# Patient Record
Sex: MALE | Race: WHITE
[De-identification: names, ages, dates, MRNs, and addresses within clinical notes are randomized per-mention and may not be internally consistent; named-entity substitution may affect disease eponyms.]

---

## 2017-01-22 ENCOUNTER — HOSPITAL ENCOUNTER (EMERGENCY)
Dept: HOSPITAL 62 - ER | Age: 27
Discharge: HOME | End: 2017-01-22
Payer: COMMERCIAL

## 2017-01-22 VITALS — DIASTOLIC BLOOD PRESSURE: 66 MMHG | SYSTOLIC BLOOD PRESSURE: 108 MMHG

## 2017-01-22 DIAGNOSIS — F84.0: ICD-10-CM

## 2017-01-22 DIAGNOSIS — Z87.820: ICD-10-CM
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
## 2017-01-22 DIAGNOSIS — G80.9: ICD-10-CM
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
## 2017-01-22 DIAGNOSIS — S00.93XA: Primary | ICD-10-CM

## 2017-01-22 DIAGNOSIS — G40.909: ICD-10-CM

## 2017-01-22 LAB
ALBUMIN SERPL-MCNC: 4.3 G/DL (ref 3.5–5)
ALP SERPL-CCNC: 68 U/L (ref 38–126)
ALT SERPL-CCNC: 30 U/L (ref 21–72)
ANION GAP SERPL CALC-SCNC: 9 MMOL/L (ref 5–19)
APPEARANCE UR: CLEAR
AST SERPL-CCNC: 20 U/L (ref 17–59)
BASOPHILS # BLD AUTO: 0 10^3/UL (ref 0–0.2)
BASOPHILS NFR BLD AUTO: 0.5 % (ref 0–2)
BILIRUB DIRECT SERPL-MCNC: 0 MG/DL (ref 0–0.3)
BILIRUB SERPL-MCNC: 0.9 MG/DL (ref 0.2–1.3)
BILIRUB UR QL STRIP: NEGATIVE
BUN SERPL-MCNC: 10 MG/DL (ref 7–20)
CALCIUM: 9 MG/DL (ref 8.4–10.2)
CHLORIDE SERPL-SCNC: 106 MMOL/L (ref 98–107)
CO2 SERPL-SCNC: 27 MMOL/L (ref 22–30)
CREAT SERPL-MCNC: 0.59 MG/DL (ref 0.52–1.25)
EOSINOPHIL # BLD AUTO: 0 10^3/UL (ref 0–0.6)
EOSINOPHIL NFR BLD AUTO: 0 % (ref 0–6)
ERYTHROCYTE [DISTWIDTH] IN BLOOD BY AUTOMATED COUNT: 12.9 % (ref 11.5–14)
GLUCOSE SERPL-MCNC: 115 MG/DL (ref 75–110)
GLUCOSE UR STRIP-MCNC: NEGATIVE MG/DL
HCT VFR BLD CALC: 39.7 % (ref 37.9–51)
HGB BLD-MCNC: 13.1 G/DL (ref 13.5–17)
HGB HCT DIFFERENCE: -0.4
KETONES UR STRIP-MCNC: NEGATIVE MG/DL
LYMPHOCYTES # BLD AUTO: 0.8 10^3/UL (ref 0.5–4.7)
LYMPHOCYTES NFR BLD AUTO: 13.7 % (ref 13–45)
MCH RBC QN AUTO: 28.5 PG (ref 27–33.4)
MCHC RBC AUTO-ENTMCNC: 33.1 G/DL (ref 32–36)
MCV RBC AUTO: 86 FL (ref 80–97)
MONOCYTES # BLD AUTO: 0.3 10^3/UL (ref 0.1–1.4)
MONOCYTES NFR BLD AUTO: 6 % (ref 3–13)
NEUTROPHILS # BLD AUTO: 4.6 10^3/UL (ref 1.7–8.2)
NEUTS SEG NFR BLD AUTO: 79.8 % (ref 42–78)
NITRITE UR QL STRIP: NEGATIVE
PH UR STRIP: 7 [PH] (ref 5–9)
POTASSIUM SERPL-SCNC: 4.4 MMOL/L (ref 3.6–5)
PROT SERPL-MCNC: 6.4 G/DL (ref 6.3–8.2)
PROT UR STRIP-MCNC: NEGATIVE MG/DL
RBC # BLD AUTO: 4.6 10^6/UL (ref 4.35–5.55)
SODIUM SERPL-SCNC: 142.4 MMOL/L (ref 137–145)
SP GR UR STRIP: 1.02
UROBILINOGEN UR-MCNC: 2 MG/DL (ref ?–2)
WBC # BLD AUTO: 5.7 10^3/UL (ref 4–10.5)

## 2017-01-22 PROCEDURE — 36415 COLL VENOUS BLD VENIPUNCTURE: CPT

## 2017-01-22 PROCEDURE — 85025 COMPLETE CBC W/AUTO DIFF WBC: CPT

## 2017-01-22 PROCEDURE — 80053 COMPREHEN METABOLIC PANEL: CPT

## 2017-01-22 PROCEDURE — 70450 CT HEAD/BRAIN W/O DYE: CPT

## 2017-01-22 PROCEDURE — 81001 URINALYSIS AUTO W/SCOPE: CPT

## 2017-01-22 PROCEDURE — 99284 EMERGENCY DEPT VISIT MOD MDM: CPT

## 2017-01-22 PROCEDURE — 72125 CT NECK SPINE W/O DYE: CPT

## 2017-01-22 NOTE — ER DOCUMENT REPORT
ED General





- General


Stated Complaint: POSSIBLE SEIZURES


Time seen by provider: 15:01


Mode of Arrival: Ambulatory


Information source: Patient


Notes: 


This is a 26-year-old man with a history of autism/cerebral palsy, TBI (6 

months old) who was brought in by ambulance after a grand mal seizure witnessed 

by the parents.  Patient's baseline is that he is nonverbal but the parents can 

find ways to communicate with him.  The patient's mother heard a bang and went 

into his room and found him at his desk where he had fallen out of his chair.  

The parents describe the patient with tonic-clonic activity, and an episode of 

flexion lasting approximately 2 minutes.  Patient did have blood in the mouth 

from a tongue bite.  They're unsure about incontinence (the patient wears pull-

ups).  Currently the patient is back to baseline.


Review of systems: Parents deny any recent fevers chills, nausea vomiting


TRAVEL OUTSIDE OF THE [place] IN LAST 30 DAYS: No





- HPI


Onset: Just prior to arrival


Onset/Duration: Sudden


Quality of pain: No pain


Severity: None


Pain Level: Denies


Associated symptoms: denies: Chills, Nonproductive cough, Productive cough, 

Fever, Shortness of breath


Exacerbated by: Denies


Relieved by: Denies


Similar symptoms previously: Yes


Recently seen / treated by doctor: No





- Related Data


Allergies/Adverse Reactions: 


 





No Known Allergies Allergy (Verified 02/16/15 14:41)


 











Past Medical History





- General


Information source: Parent





- Social History


Smoking Status: Never Smoker


Cigarette use (# per day): No


Chew tobacco use (# tins/day): No


Frequency of alcohol use: None


Drug Abuse: None


Lives with: Family


Family History: Reviewed & Not Pertinent


Patient has suicidal ideation: No


Patient has homicidal ideation: No





- Past Medical History


Cardiac Medical History: Reports: None


Pulmonary Medical History: Reports: None


EENT Medical History: Reports: None


Neurological Medical History: Reports: Other - Autism, cerebral palsy, 

traumatic brain injury, evaluated for seizures in the past


Endocrine Medical History: Reports: None


Renal/ Medical History: Reports: None


Malignancy Medical History: Reports None


GI Medical History: Reports: None


Musculoskeltal Medical History: Reports None


Skin Medical History: Reports None


Psychiatric Medical History: Reports: None


Traumatic Medical History: Reports: Hx Traumatic Brain Injury


Infectious Medical History: Reports: None


Surgical Hx: Other - Noncontributory





- Immunizations


Hx Diphtheria, Pertussis, Tetanus Vaccination: Yes





Review of Systems





- Review of Systems


Constitutional: denies: Chills, Fever


EENT: No symptoms reported


Cardiovascular: No symptoms reported


Respiratory: No symptoms reported


Gastrointestinal: No symptoms reported


Genitourinary: No symptoms reported


Male Genitourinary: No symptoms reported


Musculoskeletal: No symptoms reported


Skin: No symptoms reported


Hematologic/Lymphatic: No symptoms reported


Neurological/Psychological: See HPI





Physical Exam





- Vital signs


Vitals: 


 











Temp Pulse BP Pulse Ox


 


 97.5 F   108 H  109/57 L  98 


 


 01/22/17 15:56  01/22/17 15:56  01/22/17 15:56  01/22/17 15:56











Notes: 


Physical exam:


 


GENERAL: Patient is alert, nonverbal, does appear calm.


HEAD: He does have a right frontal hematoma, normocephalic.


EYES: Pupils equal round and reactive to light, extraocular movements intact, 

sclera anicteric, conjunctiva are normal.


ENT: TMs normal, nares patent, oropharynx clear without exudates.  Patient does 

have mild bite to the inner lip with some dried bleeding.  There is no obvious 

tongue bites.  The teeth are intact.  Moist mucous membranes.


NECK: Cervical collar in place


LUNGS: Breath sounds clear to auscultation bilaterally and equal.  No wheezes 

rales or rhonchi.


HEART: Regular rate and rhythm without murmurs, rubs or gallops.


ABDOMEN: Soft, nontender, normoactive bowel sounds.  No guarding, no rebound.  

No masses appreciated.


EXTREMITIES: Normal range of motion, no pitting or edema.  No clubbing or 

cyanosis.


NEUROLOGICAL: Cranial nerves II through XII grossly intact.  Normal speech, 

moving all extremities.


PSYCH: Normal mood, normal affect.


SKIN: Warm, Dry, normal turgor, no rashes or lesions noted.





Course





- Re-evaluation


Re-evalutation: 


01/22/17 18:22


A she was observed several hours in the ER and has been fine.  He is alert and 

even though he is nonverbal he he does communicate with his parents.  He is 

moving his head around and has no neck rigidity at all.  There is no 

photophobia.  He looks comfortable at this time.  





Discussed case with Dr. Ohara who has seen the patient in the past.  He did 

recommend that the patient start taking Keppra 500 mg twice a day.  He does 

want to see the patient at 1:30 tomorrow afternoon.  I've discussed this with 

the parents and they are in agreement with the plan.  I will start the Keppra 

in the ER tonight.





01/22/17 18:34








- Vital Signs


Vital signs: 


 











Temp Pulse Resp BP Pulse Ox


 


 97.5 F   93      108/66   96 


 


 01/22/17 15:56  01/22/17 18:14     01/22/17 18:14  01/22/17 18:14














- Laboratory


Result Diagrams: 


 01/22/17 15:20





 01/22/17 15:20


Laboratory results interpreted by me: 


 











  01/22/17 01/22/17 01/22/17





  15:20 15:20 16:38


 


Hgb  13.1 L  


 


Seg Neutrophils %  79.8 H  


 


Glucose   115 H 


 


Urine Urobilinogen    2.0 H














- Diagnostic Test


Radiology reviewed: Image reviewed, Reports reviewed - CT of the head shows 

chronic old changes.  CT of the cervical spine shows no acute fracture.





Discharge





- Discharge


Clinical Impression: 


 seizure, contusion to the head





Condition: Stable


Disposition: HOME, SELF-CARE


Instructions:  New Seizure ([facility])


Additional Instructions: 


Recommendations:





Start the Room: 500 mg twice daily.


Follow-up with Dr. Ohara: He said just come to the office at 1:30 PM.  Tell 

the  that the ER doctor at spoken to Dr. Ohara and Dr. Ohara specifically requested that he be seen at 1:30 PM.


Return to the emergency room for any concerns at shortness getting worse: Fever

, chills, recurrent seizures.


Prescriptions: 


Levetiracetam [Keppra 500 mg Tablet] 500 mg PO Q12 #60 tablet

## 2017-01-29 ENCOUNTER — HOSPITAL ENCOUNTER (EMERGENCY)
Dept: HOSPITAL 62 - ER | Age: 27
Discharge: HOME | End: 2017-01-29
Payer: COMMERCIAL

## 2017-01-29 VITALS — SYSTOLIC BLOOD PRESSURE: 121 MMHG | DIASTOLIC BLOOD PRESSURE: 79 MMHG

## 2017-01-29 DIAGNOSIS — Z79.899: ICD-10-CM

## 2017-01-29 DIAGNOSIS — F84.0: ICD-10-CM

## 2017-01-29 DIAGNOSIS — R30.0: ICD-10-CM

## 2017-01-29 DIAGNOSIS — N30.01: Primary | ICD-10-CM

## 2017-01-29 DIAGNOSIS — G80.9: ICD-10-CM

## 2017-01-29 LAB
APPEARANCE UR: (no result)
BILIRUB UR QL STRIP: NEGATIVE
GLUCOSE UR STRIP-MCNC: NEGATIVE MG/DL
KETONES UR STRIP-MCNC: NEGATIVE MG/DL
NITRITE UR QL STRIP: NEGATIVE
PH UR STRIP: 5 [PH] (ref 5–9)
PROT UR STRIP-MCNC: NEGATIVE MG/DL
SP GR UR STRIP: 1.02
UROBILINOGEN UR-MCNC: 2 MG/DL (ref ?–2)

## 2017-01-29 PROCEDURE — 96372 THER/PROPH/DIAG INJ SC/IM: CPT

## 2017-01-29 PROCEDURE — 99283 EMERGENCY DEPT VISIT LOW MDM: CPT

## 2017-01-29 PROCEDURE — 51701 INSERT BLADDER CATHETER: CPT

## 2017-01-29 PROCEDURE — 81001 URINALYSIS AUTO W/SCOPE: CPT

## 2017-01-29 PROCEDURE — 87086 URINE CULTURE/COLONY COUNT: CPT

## 2017-01-29 NOTE — ER DOCUMENT REPORT
ED GI/





- General


Time seen by provider: 09:50


Mode of Arrival: Ambulatory


Information source: Parent


TRAVEL OUTSIDE OF THE [place] IN LAST 30 DAYS: No





- HPI


Patient complains to provider of: Dysuria, Hematuria


Onset: Other - see HPI note


Quality of pain: Burning


Associated symptoms: Dysuria, Hematuria





- General


Chief Complaint: Pain With Urination


Stated Complaint: BLOOD IN URINE


Notes: 


Patient is a 26-year-old autistic male with cerebral palsy presents to the 

emergency department with hematuria.  Patient is nonverbal.  Patient's mother 

states that she discovered hematuria in the patient's pull-up yesterday and 

today.  Patient's mother also states that the patient is in pain while he 

urinates.  Patient was brought into the emergency department on 1/22 via EMS 

for a seizure.  Patient had seizures in the past and he was started back on 

Keppra.  Patient saw Dr. Ohara the neurologist, will be getting EEGs 

completed this Wednesday and Thursday.  Patient's primary physician is 

[facility], mother states she is looking to transfer to a doctor 

in [place] since they recently moved. Patient has no known allergies. (

LEFTY DAVILA)





- Related Data


Allergies/Adverse Reactions: 


 





No Known Allergies Allergy (Verified 01/29/17 09:39)


 











Past Medical History





- General


Information source: Parent, [facility]





- Social History


Smoking Status: Never Smoker


Cigarette use (# per day): No


Chew tobacco use (# tins/day): No


Smoking Education Provided: No


Frequency of alcohol use: None


Drug Abuse: None


Lives with: Parents


Family History: None


Patient has suicidal ideation: No


Patient has homicidal ideation: No


Neurological Medical History: Reports: Hx Seizures


Musculoskeltal Medical History: Reports Other - Cerebral palsy


Psychiatric Medical History: Reports: Other - Autism


Traumatic Medical History: Reports: Hx Traumatic Brain Injury





- Immunizations


Hx Diphtheria, Pertussis, Tetanus Vaccination: Yes





Review of Systems





- Review of Systems


Constitutional: No symptoms reported


EENT: No symptoms reported


Cardiovascular: No symptoms reported


Respiratory: No symptoms reported


Gastrointestinal: No symptoms reported


Genitourinary: See HPI, Burning, Dysuria, Hematuria


Male Genitourinary: No symptoms reported


Musculoskeletal: No symptoms reported


Skin: No symptoms reported


Hematologic/Lymphatic: No symptoms reported


Neurological/Psychological: No symptoms reported


-: Yes All other systems reviewed and negative





Physical Exam





- Vital signs


Interpretation: Normal





- General


General appearance: Appears well, Alert


In distress: Mild





- HEENT


Head: Normocephalic, Atraumatic


Eyes: Normal


Pupils: PERRL


Mucous membranes: Moist





- Respiratory


Respiratory status: No respiratory distress


Chest status: Nontender


Breath sounds: Normal


Chest palpation: Normal





- Cardiovascular


Rhythm: Regular


Heart sounds: Normal auscultation


Murmur: No





- Abdominal


Inspection: Normal


Distension: No distension


Bowel sounds: Normal


Tenderness: Nontender


Organomegaly: No organomegaly





- Back


Back: Normal, Nontender





- Extremities


General upper extremity: Normal inspection, Normal ROM, Normal strength


General lower extremity: Normal inspection, Normal ROM, Normal strength





- Neurological


Neuro grossly intact: Yes


Cognition: Normal


Delgado Coma Scale Eye Opening: Spontaneous


Arena Coma Scale Verbal: None


Arena Coma Scale Motor: Obeys Commands


Arena Coma Scale Total: 11


Speech: Other - none





- Psychological


Associated symptoms: Normal affect, Normal mood





- Skin


Skin Temperature: Warm


Skin Moisture: Dry





- Vital signs


Vitals: 





 











Temp Pulse Resp BP Pulse Ox


 


 97.3 F   120 H  20   125/66   97 


 


 01/29/17 09:29  01/29/17 09:29  01/29/17 09:29  01/29/17 09:29  01/29/17 09:29








 (LEFTY DAVILA)


 (PRATIK WHITMORE)





Discharge





- Discharge


Clinical Impression: 


Urinary tract infection


Qualifiers:


 Urinary tract infection type: acute cystitis Hematuria presence: with 

hematuria Qualified Code(s): N30.01 - Acute cystitis with hematuria





Condition: Stable


Disposition: HOME, SELF-CARE


Additional Instructions: 


Urinary Tract Infection:





     Your evaluation indicates that you have a urinary tract infection. This is 

due to germs growing in the bladder.  This is a common problem.


     This infection usually responds quickly to antibiotics.  Your antibiotic 

should be taken exactly as prescribed.  Drink plenty of fluids -- three to four 

quarts a day.


     Certain urine infections require a culture.  If the doctor obtained a 

culture, the results will be back in two days.  You should call to see if a 

change in treatment is needed.


     A repeat urinalysis after you finish treatment is often recommended.  The 

physician will let you know if further testing is required.


     Call the doctor if you develop fever, chills, flank pain, inability to 

urinate, or blood in the urine.








TAKE THE MEDICATION AS PRESCRIBED.


DRINK PLENTY OF FLUIDS.


FOLLOW UP WITH YOUR DOCTOR IF NOT IMPROVING.





RETURN TO THE EMERGENCY ROOM IF ANY NEW OR WORSENING SYMPTOMS.








Prescriptions: 


Sulfamethoxazole/Trimethoprim [Septra-Ds 800-160 mg Tablet] 1 tab PO BID #14 

tablet


Scribe Attestation: 





01/29/17 10:37


I personally performed the services described in the documentation, reviewed 

and edited the documentation which was dictated to the scribe in my presence, 

and it accurately records my words and actions. (PRATIK WHITMORE)





Scribe Documentation





- Scribe


Written by Rudi:: Lefty Davila 1/29/17 10:15


acting as scribe for :: Jose Armando

## 2017-05-15 ENCOUNTER — HOSPITAL ENCOUNTER (EMERGENCY)
Dept: HOSPITAL 62 - ER | Age: 27
Discharge: HOME | End: 2017-05-15
Payer: COMMERCIAL

## 2017-05-15 VITALS — SYSTOLIC BLOOD PRESSURE: 134 MMHG | DIASTOLIC BLOOD PRESSURE: 72 MMHG

## 2017-05-15 DIAGNOSIS — Z87.440: ICD-10-CM

## 2017-05-15 DIAGNOSIS — Z87.820: ICD-10-CM

## 2017-05-15 DIAGNOSIS — R31.9: Primary | ICD-10-CM

## 2017-05-15 DIAGNOSIS — R10.9: ICD-10-CM

## 2017-05-15 DIAGNOSIS — F84.0: ICD-10-CM

## 2017-05-15 LAB
ANION GAP SERPL CALC-SCNC: 12 MMOL/L (ref 5–19)
APPEARANCE UR: CLEAR
BASOPHILS # BLD AUTO: 0 10^3/UL (ref 0–0.2)
BASOPHILS NFR BLD AUTO: 0.3 % (ref 0–2)
BILIRUB UR QL STRIP: NEGATIVE
BUN SERPL-MCNC: 7 MG/DL (ref 7–20)
CALCIUM: 9.2 MG/DL (ref 8.4–10.2)
CHLORIDE SERPL-SCNC: 103 MMOL/L (ref 98–107)
CO2 SERPL-SCNC: 26 MMOL/L (ref 22–30)
CREAT SERPL-MCNC: 0.59 MG/DL (ref 0.52–1.25)
EOSINOPHIL # BLD AUTO: 0 10^3/UL (ref 0–0.6)
EOSINOPHIL NFR BLD AUTO: 0 % (ref 0–6)
ERYTHROCYTE [DISTWIDTH] IN BLOOD BY AUTOMATED COUNT: 13.1 % (ref 11.5–14)
GLUCOSE SERPL-MCNC: 90 MG/DL (ref 75–110)
GLUCOSE UR STRIP-MCNC: NEGATIVE MG/DL
HCT VFR BLD CALC: 37.7 % (ref 37.9–51)
HGB BLD-MCNC: 13.1 G/DL (ref 13.5–17)
HGB HCT DIFFERENCE: 1.6
KETONES UR STRIP-MCNC: NEGATIVE MG/DL
LYMPHOCYTES # BLD AUTO: 1.2 10^3/UL (ref 0.5–4.7)
LYMPHOCYTES NFR BLD AUTO: 25.2 % (ref 13–45)
MCH RBC QN AUTO: 29.3 PG (ref 27–33.4)
MCHC RBC AUTO-ENTMCNC: 34.8 G/DL (ref 32–36)
MCV RBC AUTO: 84 FL (ref 80–97)
MONOCYTES # BLD AUTO: 0.4 10^3/UL (ref 0.1–1.4)
MONOCYTES NFR BLD AUTO: 7.6 % (ref 3–13)
NEUTROPHILS # BLD AUTO: 3.3 10^3/UL (ref 1.7–8.2)
NEUTS SEG NFR BLD AUTO: 66.9 % (ref 42–78)
NITRITE UR QL STRIP: NEGATIVE
PH UR STRIP: 7 [PH] (ref 5–9)
POTASSIUM SERPL-SCNC: 3.9 MMOL/L (ref 3.6–5)
PROT UR STRIP-MCNC: NEGATIVE MG/DL
RBC # BLD AUTO: 4.48 10^6/UL (ref 4.35–5.55)
SODIUM SERPL-SCNC: 140.8 MMOL/L (ref 137–145)
SP GR UR STRIP: 1
UROBILINOGEN UR-MCNC: NEGATIVE MG/DL (ref ?–2)
WBC # BLD AUTO: 4.9 10^3/UL (ref 4–10.5)

## 2017-05-15 PROCEDURE — 85025 COMPLETE CBC W/AUTO DIFF WBC: CPT

## 2017-05-15 PROCEDURE — 96360 HYDRATION IV INFUSION INIT: CPT

## 2017-05-15 PROCEDURE — 51701 INSERT BLADDER CATHETER: CPT

## 2017-05-15 PROCEDURE — 81001 URINALYSIS AUTO W/SCOPE: CPT

## 2017-05-15 PROCEDURE — 87086 URINE CULTURE/COLONY COUNT: CPT

## 2017-05-15 PROCEDURE — 36415 COLL VENOUS BLD VENIPUNCTURE: CPT

## 2017-05-15 PROCEDURE — 80048 BASIC METABOLIC PNL TOTAL CA: CPT

## 2017-05-15 PROCEDURE — 74176 CT ABD & PELVIS W/O CONTRAST: CPT

## 2017-05-15 PROCEDURE — 99284 EMERGENCY DEPT VISIT MOD MDM: CPT

## 2017-05-15 NOTE — ER DOCUMENT REPORT
ED GI/





- General


Chief Complaint: Hematuria, Abdominal pain


Stated Complaint: BLOOD IN URINE


Time Seen by Provider: 05/15/17 16:27


Mode of Arrival: Ambulatory


Information source: Parent


Cannot obtain history due to: Mentally challenged


TRAVEL OUTSIDE OF THE [place] IN LAST 30 DAYS: No





- HPI


Notes: 





05/15/17 17:07


26-year-old male with history of autism and frequent UTIs presents with 

hematuria.  Mother noticed his pull up yesterday there was some blood in the 

urine and then employees at the program he goes to notice blood today as well.  

Last bowel movement was yesterday there was no blood noted then.  She gets a 

general feeling that he does not feel good though he does not complain 

specifically of any pain.  He is nonverbal.  She states he just does not seem 

to feel well, his abdomen appears to have been more swollen than normal, his 

appetite has been poor and he has been sleeping more than normal as well.  No 

fever.  He has had recent strep pharyngitis treated with a complete course of 

antibiotics in the last week and a half.





- Related Data


Allergies/Adverse Reactions: 


 





No Known Allergies Allergy (Verified 05/15/17 16:59)


 











Past Medical History





- General


Information source: Parent





- Social History


Smoking Status: Never Smoker


Family History: None


Neurological Medical History: Reports: Hx Seizures


Renal/ Medical History: Denies: Hx Peritoneal Dialysis


Traumatic Medical History: Reports: Hx Traumatic Brain Injury





- Immunizations


Hx Diphtheria, Pertussis, Tetanus Vaccination: Yes





Review of Systems





- Review of Systems


-: Yes ROS unobtainable due to patient's medical condition - Autism, nonverbal


Constitutional: No symptoms reported





Physical Exam





- Vital signs


Vitals: 


 











Pulse BP Pulse Ox


 


 127 H  112/81   98 


 


 05/15/17 16:13  05/15/17 16:13  05/15/17 16:13











Interpretation: Normal, Tachycardic





- Notes


Notes: 


GENERAL: VS as per nursing doc. Well-appearing, well-nourished and in no acute 

distress.





HEAD: Normal to inspection.





EYES: Sclera anicteric, no conjunctival injection or discharge.





ENT: Nares patent, oropharynx clear without exudates, moist mucous membranes.





NECK: Normal range of motion, supple without lymphadenopathy.





LUNGS: Breath sounds clear to auscultation bilaterally and equal.  No wheezes 

rales or rhonchi.





HEART: Regular rhythm without murmurs.  Tachycardic





ABDOMEN: Soft, non-tender, no clear distention, hyperactive bowel sounds.  No 

guarding, no rebound.  No masses appreciated.  No [place] sign.





BACK: No CVA tenderness.





EXTREMITIES: Contractures with lower extremity splints noted.





NEUROLOGICAL: Baseline, contractures noted.





PSYCH: Cooperative.





SKIN: Warm, dry, normal turgor.








Course





- Re-evaluation


Re-evalutation: 





05/15/17 19:58


Abdomen was reexamined and remains soft and nontender.  The patient is smiling 

and happy.  Discussed all findings with mother and she is aware of warning 

signs to watch for.  I redid the  exam and I could not see any source for 

bleeding.  There will continue to watch this.





- Vital Signs


Vital signs: 


 











Temp Pulse Resp BP Pulse Ox


 


    127 H  20   112/81   98 


 


    05/15/17 16:13  05/15/17 16:59  05/15/17 16:13  05/15/17 16:13














- Laboratory


Result Diagrams: 


 05/15/17 17:45





 05/15/17 17:45


Laboratory results interpreted by me: 


 











  05/15/17





  17:45


 


Hgb  13.1 L


 


Hct  37.7 L














- Diagnostic Test


Radiology reviewed: Image reviewed, Reports reviewed - No acute abnormality 

noted on CT.  Some fecal retention.  Patient did have a bowel movement 

yesterday.





Discharge





- Discharge


Clinical Impression: 


 Hematuria





Condition: Good


Disposition: HOME, SELF-CARE


Instructions:  Hematuria ([facility])


Additional Instructions: 


Return for any worsening or concerns, development of fever, repetitive 

vomiting.  Call to arrange follow-up as discussed.


Referrals: 


CASTILLO CAI MD [Primary Care Provider] - Follow up in 3-5 days


LEONEL CHEUNG MD [[facility]] - Follow up as needed (This is the 

Urologist.)

## 2017-11-26 ENCOUNTER — HOSPITAL ENCOUNTER (EMERGENCY)
Dept: HOSPITAL 62 - ER | Age: 27
Discharge: HOME | End: 2017-11-26
Payer: MEDICAID

## 2017-11-26 VITALS — SYSTOLIC BLOOD PRESSURE: 134 MMHG | DIASTOLIC BLOOD PRESSURE: 86 MMHG

## 2017-11-26 DIAGNOSIS — Z87.820: ICD-10-CM

## 2017-11-26 DIAGNOSIS — R50.9: ICD-10-CM

## 2017-11-26 DIAGNOSIS — R30.0: Primary | ICD-10-CM

## 2017-11-26 DIAGNOSIS — F84.0: ICD-10-CM

## 2017-11-26 DIAGNOSIS — Z87.440: ICD-10-CM

## 2017-11-26 DIAGNOSIS — R31.9: ICD-10-CM

## 2017-11-26 PROCEDURE — 51701 INSERT BLADDER CATHETER: CPT

## 2017-11-26 PROCEDURE — 99283 EMERGENCY DEPT VISIT LOW MDM: CPT

## 2017-11-26 PROCEDURE — 87086 URINE CULTURE/COLONY COUNT: CPT

## 2017-11-26 NOTE — ER DOCUMENT REPORT
ED GI/





- General


Chief Complaint: Urinary Problem


Stated Complaint: POSSIBLE UTI


Time Seen by Provider: 11/26/17 12:46


Notes: 





27 yo male brought to ED by parent.  pt is autistic and nonverbal.  parent 

reports pt has had low grade fever x 2 days, dysuria, hematuria, not feeling 

well, poor appetite.  Mother noticed his pull up yesterday with some blood in 

the urine   + hx/o UTI with similar presentation.  wears pull ups


TRAVEL OUTSIDE OF THE [place] IN LAST 30 DAYS: No





- HPI


Patient complains to provider of: Dysuria


Timing/Duration: Sudden


Sexual history: Inactive


Associated symptoms: Dysuria, Fever, Hematuria


Exacerbated by: Denies


Relieved by: Denies


Similar symptoms previously: Yes





- Related Data


Allergies/Adverse Reactions: 


 





No Known Allergies Allergy (Verified 11/26/17 11:41)


 











Past Medical History





- General


Information source: Parent





- Social History


Smoking Status: Never Smoker


Chew tobacco use (# tins/day): No


Frequency of alcohol use: None


Drug Abuse: None


Family History: None


Patient has suicidal ideation: No


Patient has homicidal ideation: No





- Medical History


Medical History: Other - severe autism, nonverbal


Neurological Medical History: Reports: Hx Seizures


Renal/ Medical History: Denies: Hx Peritoneal Dialysis


Traumatic Medical History: Reports: Hx Traumatic Brain Injury


Past Surgical History: Reports: Hx Orthopedic Surgery - right ankle





- Immunizations


Hx Diphtheria, Pertussis, Tetanus Vaccination: Yes





Review of Systems





- Review of Systems


Constitutional: No symptoms reported


EENT: No symptoms reported


Cardiovascular: No symptoms reported


Respiratory: No symptoms reported


Gastrointestinal: No symptoms reported


Genitourinary: See HPI


Male Genitourinary: No symptoms reported


Musculoskeletal: No symptoms reported


Skin: No symptoms reported


Hematologic/Lymphatic: No symptoms reported


Neurological/Psychological: No symptoms reported





Physical Exam





- Vital signs


Vitals: 


 











Temp Pulse Resp BP Pulse Ox


 


 97.4 F   116 H  16   134/86 H  98 


 


 11/26/17 11:41  11/26/17 11:41  11/26/17 11:41  11/26/17 11:41  11/26/17 11:41











Interpretation: Normal





- General


General appearance: Appears well, Alert





- HEENT


Head: Normocephalic, Atraumatic


Eyes: Normal


Pupils: PERRL





- Respiratory


Respiratory status: No respiratory distress


Chest status: Nontender


Breath sounds: Normal


Chest palpation: Normal





- Cardiovascular


Rhythm: Regular


Heart sounds: Normal auscultation


Murmur: No





- Abdominal


Inspection: Normal


Distension: No distension


Bowel sounds: Normal


Tenderness: Nontender


Organomegaly: No organomegaly





- Back


Back: Normal, Nontender





- Extremities


General upper extremity: Normal inspection, Nontender, Normal color, Normal ROM

, Normal temperature


General lower extremity: Normal inspection, Nontender, Normal color, Normal ROM

, Normal temperature, Normal weight bearing.  No: Edith's sign





- Neurological


Neuro grossly intact: Yes


Cognition: Normal


Orientation: AAOx4


[place] Coma Scale Eye Opening: Spontaneous


Delgado Coma Scale Verbal: Oriented


Delgado Coma Scale Motor: Obeys Commands


Delgado Coma Scale Total: 15


Speech: Normal


Motor strength normal: LUE, RUE, LLE, RLE


Sensory: Normal





- Psychological


Associated symptoms: Normal affect, Normal mood





- Skin


Skin Temperature: Warm


Skin Moisture: Dry


Skin Color: Normal





Course





- Re-evaluation


Re-evalutation: 





11/26/17 12:57


pt is afebrile, nontoxic.  will treat for uncomplicated cystitis.  urine 

culture is pending.  parent instructed to f/u pcm tomorrow.  agreeable with 

plan and stable for discharge


11/26/17 13:04


abdomen remains soft, nontender





- Vital Signs


Vital signs: 


 











Temp Pulse Resp BP Pulse Ox


 


 97.4 F   116 H  16   134/86 H  98 


 


 11/26/17 11:41  11/26/17 11:41  11/26/17 11:41  11/26/17 11:41  11/26/17 11:41














Discharge





- Discharge


Clinical Impression: 


 Dysuria





Condition: Stable


Disposition: HOME, SELF-CARE


Instructions:  Antibiotic Therapy ([facility]), Urinary Tract Infection ([facility])


Additional Instructions: 


I am treating Terrence for a urinary tract infection


Please take all antibiotic as prescribed


Urine culture is pending.  If any further treatment is needed, we will call you


follow up with primary care tomorrow


Prescriptions: 


Ciprofloxacin HCl [Cipro 500 mg Tablet] 500 mg PO BID #10 tablet


Referrals: 


JOANNE HENDERSON DO [Primary Care Provider] - Follow up as needed

## 2019-01-12 ENCOUNTER — HOSPITAL ENCOUNTER (EMERGENCY)
Dept: HOSPITAL 62 - ER | Age: 29
Discharge: HOME | End: 2019-01-12
Payer: MEDICAID

## 2019-01-12 VITALS — SYSTOLIC BLOOD PRESSURE: 121 MMHG | DIASTOLIC BLOOD PRESSURE: 72 MMHG

## 2019-01-12 DIAGNOSIS — Z79.899: ICD-10-CM

## 2019-01-12 DIAGNOSIS — Z76.0: Primary | ICD-10-CM

## 2019-01-12 PROCEDURE — 99281 EMR DPT VST MAYX REQ PHY/QHP: CPT

## 2019-01-12 NOTE — ER DOCUMENT REPORT
HPI





- HPI


Time Seen by Provider: 01/12/19 13:32


Pain Level: Denies


Notes: 





Patient is a 28-year-old male patient who presents in the company of his mother 

for request for medication refill.  Patient has history of severe autism and 

mother is his primary care provider.  She states that patient usually takes 

Vimpat 200 mg twice daily for his seizures.  She states this is prescribed by 

his neurologist Dr. Hopper.  Mother reports allegedly Dr. Hopper has left 

town and she did not know this was occurring.  Patient's last dose of medication

was 2 days ago.








- CONSTITUTIONAL


Constitutional: DENIES: Fever, Chills





Past Medical History





- General


Information source: Patient, Parent





- Social History


Smoking Status: Never Smoker


Frequency of alcohol use: None


Drug Abuse: None


Family History: None


Patient has suicidal ideation: No


Patient has homicidal ideation: No


Neurological Medical History: Reports: Hx Seizures


Renal/ Medical History: Denies: Hx Peritoneal Dialysis


Traumatic Medical History: Reports: Hx Traumatic Brain Injury


Past Surgical History: Reports: Hx Orthopedic Surgery - right ankle





- Immunizations


Hx Diphtheria, Pertussis, Tetanus Vaccination: Yes





[facility] Provider Document





- CONSTITUTIONAL


Notes: 





PHYSICAL EXAMINATION:





GENERAL: Well-appearing, well-nourished and in no acute distress.





HEAD: Atraumatic, normocephalic.





EYES: Pupils equal round extraocular movements intact,  conjunctiva are normal.





ENT: Nares patent





NECK: Normal range of motion





LUNGS: No respiratory distress





Musculoskeletal: Normal range of motion





NEUROLOGICAL:  Normal speech, normal gait. 





PSYCH: Normal mood, normal affect.





SKIN: Warm, Dry, normal turgor, no rashes or lesions noted.





- INFECTION CONTROL


TRAVEL OUTSIDE OF THE [place] IN LAST 30 DAYS: No





Course





- Re-evaluation


Re-evalutation: 





Medication was refilled with one refill provided.  Mother given multiple 

contacts to establish new primary care as well as follow-up with a new 

neurologist.  Mother verbalizes understanding and will get patient established 

with any primary care provider so that he can have better continuity of care.  

Patient has no acute needs today.








- Vital Signs


Vital signs: 


                                        











Temp Pulse Resp BP Pulse Ox


 


 97.9 F   122 H  18   121/72   98 


 


 01/12/19 13:29  01/12/19 13:29  01/12/19 13:29  01/12/19 13:29  01/12/19 13:29














Discharge





- Discharge


Clinical Impression: 


 Medication refill





Condition: Stable


Disposition: HOME, SELF-CARE


Additional Instructions: 


His medication was refilled today with 1 refill.  Please have the pharmacy call 

if there is any issue with this.  You may want to try the following new clinics 

here in [facility] to establish primary care and call and see if they have neurology 

services.





[facility] has an office here in [place].


formerly [facility] has an office on [address] here in [place].


[facility] has a clinic here in [place].





I believe at least one of these does have neurology coverage a few times weekly.


Prescriptions: 


Lacosamide [Vimpat] 200 mg PO BID #60 tablet


Referrals: 


[facility] [Provider Group] - Follow up as needed

## 2019-02-15 ENCOUNTER — HOSPITAL ENCOUNTER (EMERGENCY)
Dept: HOSPITAL 62 - ER | Age: 29
Discharge: HOME | End: 2019-02-15
Payer: MEDICAID

## 2019-02-15 VITALS — DIASTOLIC BLOOD PRESSURE: 64 MMHG | SYSTOLIC BLOOD PRESSURE: 99 MMHG

## 2019-02-15 DIAGNOSIS — K59.00: Primary | ICD-10-CM

## 2019-02-15 DIAGNOSIS — F84.0: ICD-10-CM

## 2019-02-15 DIAGNOSIS — R63.0: ICD-10-CM

## 2019-02-15 DIAGNOSIS — R09.81: ICD-10-CM

## 2019-02-15 DIAGNOSIS — R05: ICD-10-CM

## 2019-02-15 LAB
A TYPE INFLUENZA AG: NEGATIVE
B INFLUENZA AG: NEGATIVE

## 2019-02-15 PROCEDURE — 87880 STREP A ASSAY W/OPTIC: CPT

## 2019-02-15 PROCEDURE — 71046 X-RAY EXAM CHEST 2 VIEWS: CPT

## 2019-02-15 PROCEDURE — 87804 INFLUENZA ASSAY W/OPTIC: CPT

## 2019-02-15 PROCEDURE — 74018 RADEX ABDOMEN 1 VIEW: CPT

## 2019-02-15 PROCEDURE — 99283 EMERGENCY DEPT VISIT LOW MDM: CPT

## 2019-02-15 PROCEDURE — 87070 CULTURE OTHR SPECIMN AEROBIC: CPT

## 2019-02-15 NOTE — RADIOLOGY REPORT (SQ)
EXAM DESCRIPTION:  CHEST 2 VIEWS



COMPLETED DATE/TIME:  2/15/2019 12:29 pm



REASON FOR STUDY:  cough, fever



COMPARISON:  None.



EXAM PARAMETERS:  NUMBER OF VIEWS: two views

TECHNIQUE: Digital Frontal and Lateral radiographic views of the chest acquired.

RADIATION DOSE: NA

LIMITATIONS: none



FINDINGS:  LUNGS AND PLEURA: No opacities, masses or pneumothorax. No pleural effusion.

MEDIASTINUM AND HILAR STRUCTURES: No masses or contour abnormalities.

HEART AND VASCULAR STRUCTURES: Heart normal size.  No evidence for failure.

BONES: No acute findings.

HARDWARE: None in the chest.

OTHER: No other significant finding.



IMPRESSION:  1. NO ACUTE RADIOGRAPHIC FINDING IN THE CHEST.



TECHNICAL DOCUMENTATION:  JOB ID:  [id number]

 2011 [employer]- All Rights Reserved



Reading location - IP/workstation name: [email or web address]

## 2019-02-15 NOTE — RADIOLOGY REPORT (SQ)
EXAM DESCRIPTION:  KUB/ABDOMEN (SINGLE VIEW)



COMPLETED DATE/TIME:  2/15/2019 2:44 pm



REASON FOR STUDY:  abd pain



COMPARISON:  None.



NUMBER OF VIEWS:  One view.



TECHNIQUE:   Supine radiographic image of the abdomen acquired.



LIMITATIONS:  None.



FINDINGS:  BOWEL GAS PATTERN: Gas pattern is nonobstructive.  There is large amount of stool througho
ut the colon consistent with constipation.

CALCIFICATIONS: No suspicious calcifications.

SOFT TISSUES: No gross mass or suggestion of organomegaly.

HARDWARE: None in the abdomen.

BONES: No acute fracture. No worrisome bone lesions.

OTHER: No other significant finding.



IMPRESSION:  Constipation.  No obstruction.



TECHNICAL DOCUMENTATION:  JOB ID:  [id number]

 2011 [employer]- All Rights Reserved



Reading location - IP/workstation name: [email or web address]